# Patient Record
(demographics unavailable — no encounter records)

---

## 2024-10-17 NOTE — CONSULT LETTER
[Dear  ___] : Dear  [unfilled], [Consult Letter:] : I had the pleasure of evaluating your patient, [unfilled]. [Please see my note below.] : Please see my note below. [Consult Closing:] : Thank you very much for allowing me to participate in the care of this patient.  If you have any questions, please do not hesitate to contact me. [Sincerely,] : Sincerely, [FreeTextEntry3] : Atif Rodríguez MD FCCP Pulmonary/Critical Care/Sleep Medicine Department of Internal Medicine  [facility]

## 2024-10-17 NOTE — HISTORY OF PRESENT ILLNESS
[Awakes Unrefreshed] : awakes unrefreshed [Snoring] : snoring [Home] : home [Awakes with Dry Mouth] : does not awaken with dry mouth [Awakes with Headache] : does not awaken with headache [TextBox_77] : 0000 [TextBox_79] : 5836 [TextBox_81] : 60 [TextBox_83] : 2hrs [TextBox_89] : 1-2 [TextBox_100] : 9/3/2024 [TextBox_108] : 26.4 [TextBox_112] : 2.9min [TextBox_116] : 76 [TextBox_120] : TST 406min [ESS] : 7

## 2024-10-17 NOTE — REASON FOR VISIT
[Follow-Up] : a follow-up visit [Sleep Apnea] : sleep apnea [Ad Hoc ] : provided by an ad hoc  [Interpreters_FullName] : Eliz [Interpreters_Relationshiptopatient] : Daughter [TWNoteComboBox1] : [place]

## 2024-10-17 NOTE — DISCUSSION/SUMMARY
[Obstructive Sleep Apnea] : obstructive sleep apnea [Moderate] : moderate in severity [Alcohol Avoidance] : alcohol avoidance [Sedative Avoidance] : sedative avoidance [Weight Loss Program] : weight loss program [de-identified] : The pathophysiology of sleep was explained to the patient in detail. Inclusive of this was the reasoning behind and the expected response to positive airway pressure therapy. Compliance was outlined including further followup [FreeTextEntry1] : CPAP was initiated with full explanation of the physiology behind treatment, compliance requirements and care of equipment.

## 2024-12-31 NOTE — CONSULT LETTER
[Dear  ___] : Dear  [unfilled], [Consult Letter:] : I had the pleasure of evaluating your patient, [unfilled]. [Please see my note below.] : Please see my note below. [Consult Closing:] : Thank you very much for allowing me to participate in the care of this patient.  If you have any questions, please do not hesitate to contact me. [Sincerely,] : Sincerely, [FreeTextEntry3] : Atif Rodríguez MD FCCP Pulmonary/Critical Care/Sleep Medicine Department of Internal Medicine  [facility]

## 2024-12-31 NOTE — REASON FOR VISIT
[Follow-Up] : a follow-up visit [Sleep Apnea] : sleep apnea [Ad Hoc ] : provided by an ad hoc  [Interpreters_FullName] : Eliz [Interpreters_Relationshiptopatient] : Daughter [TWNoteComboBox1] : [place]

## 2024-12-31 NOTE — END OF VISIT
[Time Spent: ___ minutes] : I have spent [unfilled] minutes of time on the encounter which excludes teaching and separately reported services. [FreeTextEntry3] :  used

## 2024-12-31 NOTE — HISTORY OF PRESENT ILLNESS
[Awakes Unrefreshed] : awakes unrefreshed [Snoring] : snoring [Home] : home [APAP:] : APAP [Nasal pillow] : nasal pillow [Awakes with Dry Mouth] : does not awaken with dry mouth [Awakes with Headache] : does not awaken with headache [TextBox_77] : 0000 [TextBox_79] : 8788 [TextBox_81] : 60 [TextBox_83] : 2hrs [TextBox_89] : 1-2 [TextBox_100] : 9/3/2024 [TextBox_108] : 26.4 [TextBox_112] : 2.9min [TextBox_116] : 76 [TextBox_120] : TST 406min [TextBox_125] : 4-16 [TextBox_127] : 11/19/2024 [TextBox_129] : 12/18/2024 [TextBox_158] : Ernesto [TextBox_162] : 11/2024 [TextBox_165] : Failing CPAP secondary to mask [ESS] : 7

## 2024-12-31 NOTE — PROCEDURE
[FreeTextEntry1] : Patient was fitted by me with a ESon 2 medium nasal mask.  Sample provided and description of need for mask change on options was provided

## 2024-12-31 NOTE — DISCUSSION/SUMMARY
[Obstructive Sleep Apnea] : obstructive sleep apnea [Moderate] : moderate in severity [Alcohol Avoidance] : alcohol avoidance [Sedative Avoidance] : sedative avoidance [Weight Loss Program] : weight loss program [Unchanged] : unchanged [de-identified] : Mask change as above